# Patient Record
Sex: FEMALE | Race: WHITE | NOT HISPANIC OR LATINO | Employment: FULL TIME | URBAN - METROPOLITAN AREA
[De-identification: names, ages, dates, MRNs, and addresses within clinical notes are randomized per-mention and may not be internally consistent; named-entity substitution may affect disease eponyms.]

---

## 2024-04-19 ENCOUNTER — HOSPITAL ENCOUNTER (EMERGENCY)
Facility: HOSPITAL | Age: 49
Discharge: HOME | End: 2024-04-19
Attending: STUDENT IN AN ORGANIZED HEALTH CARE EDUCATION/TRAINING PROGRAM
Payer: COMMERCIAL

## 2024-04-19 VITALS
SYSTOLIC BLOOD PRESSURE: 142 MMHG | HEIGHT: 61 IN | DIASTOLIC BLOOD PRESSURE: 74 MMHG | WEIGHT: 140 LBS | TEMPERATURE: 97 F | HEART RATE: 72 BPM | BODY MASS INDEX: 26.43 KG/M2 | RESPIRATION RATE: 18 BRPM | OXYGEN SATURATION: 100 %

## 2024-04-19 DIAGNOSIS — S60.221A: Primary | ICD-10-CM

## 2024-04-19 PROCEDURE — 99281 EMR DPT VST MAYX REQ PHY/QHP: CPT

## 2024-04-19 ASSESSMENT — LIFESTYLE VARIABLES
EVER FELT BAD OR GUILTY ABOUT YOUR DRINKING: NO
TOTAL SCORE: 0
HAVE PEOPLE ANNOYED YOU BY CRITICIZING YOUR DRINKING: NO
EVER HAD A DRINK FIRST THING IN THE MORNING TO STEADY YOUR NERVES TO GET RID OF A HANGOVER: NO
HAVE YOU EVER FELT YOU SHOULD CUT DOWN ON YOUR DRINKING: NO

## 2024-04-19 ASSESSMENT — PAIN - FUNCTIONAL ASSESSMENT: PAIN_FUNCTIONAL_ASSESSMENT: 0-10

## 2024-04-19 ASSESSMENT — PAIN DESCRIPTION - ONSET: ONSET: ONGOING

## 2024-04-19 ASSESSMENT — PAIN DESCRIPTION - DESCRIPTORS: DESCRIPTORS: ACHING

## 2024-04-19 ASSESSMENT — COLUMBIA-SUICIDE SEVERITY RATING SCALE - C-SSRS
2. HAVE YOU ACTUALLY HAD ANY THOUGHTS OF KILLING YOURSELF?: NO
1. IN THE PAST MONTH, HAVE YOU WISHED YOU WERE DEAD OR WISHED YOU COULD GO TO SLEEP AND NOT WAKE UP?: NO
6. HAVE YOU EVER DONE ANYTHING, STARTED TO DO ANYTHING, OR PREPARED TO DO ANYTHING TO END YOUR LIFE?: NO

## 2024-04-19 ASSESSMENT — PAIN DESCRIPTION - ORIENTATION: ORIENTATION: RIGHT

## 2024-04-19 ASSESSMENT — PAIN SCALES - GENERAL: PAINLEVEL_OUTOF10: 2

## 2024-04-19 ASSESSMENT — PAIN DESCRIPTION - LOCATION: LOCATION: HAND

## 2024-04-19 ASSESSMENT — PAIN DESCRIPTION - FREQUENCY: FREQUENCY: CONSTANT/CONTINUOUS

## 2024-04-19 NOTE — DISCHARGE INSTRUCTIONS
Follow-up with your PCP and return to the ER for any persistent or worsening symptoms including but not limited to severe hand pain and decreased range of motion.

## 2024-04-20 NOTE — ED PROVIDER NOTES
HPI   Chief Complaint   Patient presents with    bruise     Pt has bruise to right hand after havibg IV in place       HPI     49-year-old female patient arriving after she had a GI scope this morning at 7:30 AM.  She had an IV placed there.  At 5:30 PM while she was cleaning at work she says that she sustained a right hand bruise while taking off her glove.  She has ecchymosis sustained at the third and fourth dorsal aspect of her hand.  She describes soreness and tenderness to palpation.  She says that she has no change in sensation.  Exam is significant for slightly decreased  strength in the right hand.  Ecchymosis noted to the third and fourth dorsal metacarpals.  Denies nausea vomiting fever chills chest pain dyspnea dyschezia dysuria.     Sin no significant past medical, surgical, family history.          [place] Coma Scale Score: 15                     Patient History   History reviewed. No pertinent past medical history.  History reviewed. No pertinent surgical history.  No family history on file.  Social History     Tobacco Use    Smoking status: Former     Current packs/day: 0.00     Types: Cigarettes     Quit date: 2023     Years since quittin.0    Smokeless tobacco: Never   Vaping Use    Vaping status: Not on file   Substance Use Topics    Alcohol use: Never    Drug use: Yes     Types: Marijuana     Comment: sociallly       Physical Exam   ED Triage Vitals [24 1753]   Temperature Heart Rate Respirations BP   36.1 °C (97 °F) 73 16 148/79      Pulse Ox Temp Source Heart Rate Source Patient Position   99 % Temporal Monitor Sitting      BP Location FiO2 (%)     Left arm --       Physical Exam  General: Alert, no acute distress, well developed, Well hydrated  Head: NCAT  Eyes: Clear conjunctiva, EOMI  ENT: Moist mucosa, no lymphadenopathy  Respiratory: Normal respiratory effort. CTAB. Symmetric aeration. No wheezes, rales, or Rhonchi.  CV: Normal rhythm, Normal Rate, pulses present  bilaterally  GI: Soft, NT, no guarding, BS normoactive, No distention, No hernia, No palpable mass.  : no flank tenderness, no cva tenderness  MSK: slightly decreased  strength in the right hand.    Skin: Warm, c/d/I Ecchymosis noted to the third and fourth dorsal metacarpals.  Neuro: AOx3, facial symmetry,   sensorimotor intact in extremities,   CN intact, Nonfocal neurological exam    ED Course & MDM   Diagnoses as of 04/20/24 1525   Traumatic ecchymosis of hand, right, initial encounter       Medical Decision Making      49-year-old female patient arriving after she had a GI scope this morning at 7:30 AM.  She had an IV placed there.  At 5:30 PM while she was cleaning at work she says that she sustained a right hand bruise while taking off her glove.  She has ecchymosis sustained at the third and fourth dorsal aspect of her hand.  She describes soreness, numbness and tenderness to palpation.  She says that she has no change in sensation.  Exam is significant for slightly decreased  strength in the right hand.  Ecchymosis noted to the third and fourth dorsal metacarpals.  Denies nausea vomiting fever chills chest pain dyspnea dyschezia dysuria.    Vital signs reassuring.  Patient is nontoxic-appearing.  Traumatic ecchymosis of the hand noted likely 2/2 to IV placement.  She has full range of motion and x-rays are not indicated for any acute fracture at this time, given no significant trauma.    She was given PCP follow-up.  She was given acute pain discharge instructions of periodic ice.  She was given return precautions for any persistent or worsening symptoms including but not limited to severe hand pain and decreased range of motion.  She was given anticipatory guidance.  She was in agreement with the plan.      External Records Reviewed: I reviewed recent and relevant outside records including: prior  Independent Interpretation of Studies: I independently interpreted: As above  Social Determinants  Affecting Care: Diagnostic testing considered: As above    I reviewed the case with the attending ER physician. Patient and/or patient´s representative was counseled regarding labs, imaging, likely diagnosis, and plan.     Jone Dimas MD MS  PGY-1, Emergency Medicine    The above documentation was completed with the use of speech recognition software. It may contain dictation errors secondary to limitations of the software.         Jone Dimas MD  Resident  04/20/24 1528       Jone Dimas MD  Resident  04/20/24 1529       Paula Chaudhari MD  04/22/24 1542